# Patient Record
Sex: FEMALE | Race: WHITE | HISPANIC OR LATINO | Employment: UNEMPLOYED | URBAN - METROPOLITAN AREA
[De-identification: names, ages, dates, MRNs, and addresses within clinical notes are randomized per-mention and may not be internally consistent; named-entity substitution may affect disease eponyms.]

---

## 2017-02-21 ENCOUNTER — ALLSCRIPTS OFFICE VISIT (OUTPATIENT)
Dept: OTHER | Facility: OTHER | Age: 77
End: 2017-02-21

## 2017-03-30 ENCOUNTER — ALLSCRIPTS OFFICE VISIT (OUTPATIENT)
Dept: OTHER | Facility: OTHER | Age: 77
End: 2017-03-30

## 2017-03-30 DIAGNOSIS — J45.901 ASTHMA WITH ACUTE EXACERBATION: ICD-10-CM

## 2017-03-30 LAB
FLUAV AG SPEC QL IA: NEGATIVE
INFLUENZA B AG (HISTORICAL): NEGATIVE

## 2018-01-13 VITALS
DIASTOLIC BLOOD PRESSURE: 90 MMHG | HEIGHT: 60 IN | RESPIRATION RATE: 22 BRPM | OXYGEN SATURATION: 94 % | HEART RATE: 71 BPM | SYSTOLIC BLOOD PRESSURE: 155 MMHG | WEIGHT: 171.25 LBS | TEMPERATURE: 99 F | BODY MASS INDEX: 33.62 KG/M2

## 2018-01-14 VITALS
SYSTOLIC BLOOD PRESSURE: 150 MMHG | TEMPERATURE: 98.6 F | RESPIRATION RATE: 22 BRPM | HEART RATE: 89 BPM | OXYGEN SATURATION: 91 % | DIASTOLIC BLOOD PRESSURE: 80 MMHG

## 2018-01-16 NOTE — RESULT NOTES
Message   notify pt labs stable      tx     Verified Results  (1) BASIC METABOLIC PROFILE [id number] 09:10AM Charles Rose     Test Name Result Flag Reference   GLUCOSE 74 mg/dL  65-99   Fasting reference interval   UREA NITROGEN (BUN) 21 mg/dL  7-25   CREATININE 0 89 mg/dL  0 60-0 93   For patients >52years of age, the reference limit  for Creatinine is approximately 13% higher for people  identified as -American  eGFR NON-AFR   AMERICAN 63 mL/min/1 73m2  > OR = 60   eGFR AFRICAN AMERICAN 72 mL/min/1 73m2  > OR = 60   BUN/CREATININE RATIO   5-92   NOT APPLICABLE (calc)   SODIUM 140 mmol/L  135-146   POTASSIUM 4 5 mmol/L  3 5-5 3   CHLORIDE 103 mmol/L     CARBON DIOXIDE 27 mmol/L  19-30   CALCIUM 9 2 mg/dL  8 6-10 4     (1) CBC/PLT/DIFF [id number] 09:10AM Magnus Brush   REPORT COMMENT:  FASTING:YES     Test Name Result Flag Reference   WHITE BLOOD CELL COUNT 8 4 Thousand/uL  3 8-10 8   RED BLOOD CELL COUNT 4 27 Million/uL  3 80-5 10   HEMOGLOBIN 10 6 g/dL L 11 7-15 5   HEMATOCRIT 34 0 % L 35 0-45 0   MCV 79 6 fL L 80 0-100 0   MCH 24 7 pg L 27 0-33 0   MCHC 31 0 g/dL L 32 0-36 0   RDW 17 3 % H 11 0-15 0   PLATELET COUNT 962 Thousand/uL  140-400   MPV 8 8 fL  7 5-11 5   ABSOLUTE NEUTROPHILS 5687 cells/uL  1399-4119   ABSOLUTE LYMPHOCYTES 1932 cells/uL  850-3900   ABSOLUTE MONOCYTES 437 cells/uL  200-950   ABSOLUTE EOSINOPHILS 302 cells/uL     ABSOLUTE BASOPHILS 42 cells/uL  0-200   NEUTROPHILS 67 7 %     LYMPHOCYTES 23 0 %     MONOCYTES 5 2 %     EOSINOPHILS 3 6 %     BASOPHILS 0 5 %         Signatures   Electronically signed by : GARO Vang ; Jul 8 2016  1:17PM EST                       (Author)

## 2018-01-18 NOTE — RESULT NOTES
Message   please notify pt labs stable      tx     Verified Results  (1) CBC/PLT/DIFF [id number] 10:52AM Qamar Reyes Order Number: [id number]    TW Order Number: [id number]     Test Name Result Flag Reference   WBC COUNT 7 69 Thousand/uL  4 31-10 16   RBC COUNT 4 12 Million/uL  3 81-5 12   HEMOGLOBIN 10 2 g/dL L 11 5-15 4   HEMATOCRIT 33 3 % L 34 8-46  1   MCV 81 fL L 82-98   MCH 24 8 pg L 26 8-34 3   MCHC 30 6 g/dL L 31 4-37 4   RDW 17 1 % H 11 6-15 1   MPV 10 2 fL  8 9-12 7   PLATELET COUNT 801 Thousands/uL  149-390   nRBC AUTOMATED 0 /100 WBCs     NEUTROPHILS RELATIVE PERCENT 67 %  43-75   LYMPHOCYTES RELATIVE PERCENT 19 %  14-44   MONOCYTES RELATIVE PERCENT 8 %  4-12   EOSINOPHILS RELATIVE PERCENT 5 %  0-6   BASOPHILS RELATIVE PERCENT 1 %  0-1   NEUTROPHILS ABSOLUTE COUNT 5 16 Thousands/µL  1 85-7 62   LYMPHOCYTES ABSOLUTE COUNT 1 48 Thousands/µL  0 60-4 47   MONOCYTES ABSOLUTE COUNT 0 63 Thousand/µL  0 17-1 22   EOSINOPHILS ABSOLUTE COUNT 0 36 Thousand/µL  0 00-0 61   BASOPHILS ABSOLUTE COUNT 0 05 Thousands/µL  0 00-0 10     (1) VITAMIN D 25-HYDROXY [id number] 10:52AM Qamar Reyes Order Number: [id number]     Order Number: [id number]     Test Name Result Flag Reference   VIT D 25-HYDROX 41 0 ng/mL  30 0-100 0     (1) BASIC METABOLIC PROFILE [id number] 10:52AM Qamar Reyes Order Number: [id number]      National Kidney Disease Education Program recommendations are as follows:  GFR calculation is accurate only with a steady state creatinine  Chronic Kidney disease less than 60 ml/min/1 73 sq  meters  Kidney failure less than 15 ml/min/1 73 sq  meters  Test Name Result Flag Reference   GLUCOSE,RANDM 98 mg/dL     If the patient is fasting, the ADA then defines impaired fasting glucose as > 100 mg/dL and diabetes as > or equal to 123 mg/dL     SODIUM 142 mmol/L  136-145   POTASSIUM 4 6 mmol/L  3 5-5 3   CHLORIDE 108 mmol/L  100-108   CARBON DIOXIDE 31 mmol/L  21-32 ANION GAP (CALC) 3 mmol/L L 4-13   BLOOD UREA NITROGEN 29 mg/dL H 5-25   CREATININE 0 94 mg/dL  0 60-1 30   Standardized to IDMS reference method   CALCIUM 9 0 mg/dL  8 3-10 1   eGFR Non-African American 57 9 ml/min/1 73sq m       (1) HEMOGLOBIN A1C [id number] 10:52AM Destiny Claude Order Number: [id number]      5 7-6 4% impaired fasting glucose  >=6 5% diagnosis of diabetes    Falsely low levels are seen in conditions linked to short RBC life span-  hemolytic anemia, and splenomegaly  Falsely elevated levels are seen in situations where there is an increased production of RBC- receipt of erythropoietin or blood transfusions  Adopted from ADA-Clinical Practice Recommendations     Test Name Result Flag Reference   HEMOGLOBIN A1C 5 6 %  4 0-5 6   EST  AVG   GLUCOSE 114 mg/dl         Signatures   Electronically signed by : GARO Diop ; Apr 4 2016  5:49PM EST                       (Author)